# Patient Record
Sex: MALE | URBAN - METROPOLITAN AREA
[De-identification: names, ages, dates, MRNs, and addresses within clinical notes are randomized per-mention and may not be internally consistent; named-entity substitution may affect disease eponyms.]

---

## 2019-03-13 NOTE — ED PROVIDER NOTES
Patient Seen in: [facility] [place]    History   Patient presents with:  Fever (infectious)  Sore Throat    Stated Complaint: fever, sore throat     HPI    Patient is a healthy 48-year-old male who presents to immediate care com 11\")   Wt 111.1 kg   SpO2 96%   BMI 34.17 kg/m²         Physical Exam   Constitutional: He appears well-developed and well-nourished. HENT:   Head: Normocephalic.    Right Ear: Hearing and tympanic membrane normal.   Left Ear: Tympanic membrane normal.

## 2021-03-11 PROBLEM — M79.643 HAND PAIN: Status: ACTIVE | Noted: 2021-03-11

## 2021-05-28 ENCOUNTER — IMMUNIZATION (OUTPATIENT)
Dept: LAB | Facility: HOSPITAL | Age: 36
End: 2021-05-28
Attending: EMERGENCY MEDICINE
Payer: COMMERCIAL

## 2021-05-28 DIAGNOSIS — Z23 NEED FOR VACCINATION: Primary | ICD-10-CM

## 2021-05-28 PROCEDURE — 0001A SARSCOV2 VAC 30MCG/0.3ML IM: CPT

## 2021-06-18 ENCOUNTER — IMMUNIZATION (OUTPATIENT)
Dept: LAB | Facility: HOSPITAL | Age: 36
End: 2021-06-18
Attending: EMERGENCY MEDICINE
Payer: COMMERCIAL

## 2021-06-18 DIAGNOSIS — Z23 NEED FOR VACCINATION: Primary | ICD-10-CM

## 2021-06-18 PROCEDURE — 0002A SARSCOV2 VAC 30MCG/0.3ML IM: CPT
